# Patient Record
Sex: MALE | Race: WHITE
[De-identification: names, ages, dates, MRNs, and addresses within clinical notes are randomized per-mention and may not be internally consistent; named-entity substitution may affect disease eponyms.]

---

## 2016-09-14 VITALS
SYSTOLIC BLOOD PRESSURE: 110 MMHG | DIASTOLIC BLOOD PRESSURE: 54 MMHG | SYSTOLIC BLOOD PRESSURE: 110 MMHG | DIASTOLIC BLOOD PRESSURE: 54 MMHG

## 2017-01-27 ENCOUNTER — HOSPITAL ENCOUNTER (OUTPATIENT)
Dept: HOSPITAL 61 - KCIC | Age: 67
Discharge: HOME | End: 2017-01-27
Attending: FAMILY MEDICINE
Payer: COMMERCIAL

## 2017-01-27 DIAGNOSIS — M25.512: ICD-10-CM

## 2017-01-27 DIAGNOSIS — M25.511: Primary | ICD-10-CM

## 2017-01-27 DIAGNOSIS — M19.012: ICD-10-CM

## 2017-01-27 PROCEDURE — 73030 X-RAY EXAM OF SHOULDER: CPT

## 2017-05-15 ENCOUNTER — HOSPITAL ENCOUNTER (OUTPATIENT)
Dept: HOSPITAL 61 - KCIC | Age: 67
Discharge: HOME | End: 2017-05-15
Attending: NURSE PRACTITIONER
Payer: COMMERCIAL

## 2017-05-15 DIAGNOSIS — M75.92: ICD-10-CM

## 2017-05-15 DIAGNOSIS — M25.512: Primary | ICD-10-CM

## 2017-05-15 PROCEDURE — 73201 CT UPPER EXTREMITY W/DYE: CPT

## 2017-05-15 PROCEDURE — 73040 CONTRAST X-RAY OF SHOULDER: CPT

## 2017-05-15 NOTE — [facility]
PROCEDURE: 

Left shoulder injection using fluoroscopic guidance, prior to CT. 

HISTORY: 

Shoulder pain. 

TECHNIQUE: 

The procedure was explained to the patient as were potential risks, 

including among others infection, bleeding or allergic reaction. All 

questions were answered. Informed written and verbal consent was obtained.

The shoulder was prepped and draped in the usual sterile manner. Following

administration of local anesthetic, a 22-gauge needle was advanced into 

the anterior shoulder. Following negative aspiration, 12 cc of a solution 

of 15 cc Omnipaque-180 contrast and 5 cc 1% lidocaine was injected without

difficulty. The needle was removed. There was good hemostasis at the 

injection site. The patient left in stable condition without immediate 

complication. The patient was given postprocedural instructions, and 

instructed to contact us or the ER if there are any complications. 

A single spot image is obtained. 

FLUOROSCOPY TIME: 18 seconds 

 

Electronically signed by: Anders Cheek MD (May 15, 2017 17:18:58)

## 2017-05-15 NOTE — [facility]
PROCEDURE 

CT arthrogram of the left shoulder 

 

HISTORY 

Rotator cuff tendinitis. Pain for 1 year. Overhead repetitive motion. 

 

TECHNIQUE 

Standard noncontrast imaging 

Exposure: One or more of the following individualized dose reduction 

techniques were utilized for this exam: 1. Automated exposure control. 2. 

Adjustment of the mA and/or kV according to patient size. 3. Use of 

iterative reconstruction technique. 

 

COMPARISON 

None 

 

FINDINGS 

Acromioclavicular joint demonstrates primary osteoarthritis with small 

undersurface osteophytes. 

No evidence of abnormal contrast within the rotator cuff. Minimal contrast

within the anterior subdeltoid bursa likely just due to the injection 

procedure. No evidence of communicating defect. 

Glenohumeral joint appears intact without focal articular cartilage 

defect. 

Biceps tendon appears within the bicipital groove. 

Small focus of contrast identified at the anterior labrum, at 3 o'clock. I

suspect this is located just anterior to the labrum, rather than intra 

labral signal or tear. 

No obvious soft tissue abnormality. 

 

IMPRESSION 

1. No evidence of rotator cuff tear.

2. Acromioclavicular joint primary osteoarthritis.

3. Small focus of contrast identified at the anterior labrum, thought to 

be extralabral, rather than a tear.

 

 

 

Electronically signed by: Anders Cheek MD (May 15, 2017 14:49:58)

## 2017-06-01 ENCOUNTER — HOSPITAL ENCOUNTER (OUTPATIENT)
Dept: HOSPITAL 61 - CCL | Age: 67
Discharge: HOME | End: 2017-06-01
Attending: INTERNAL MEDICINE
Payer: COMMERCIAL

## 2017-06-01 VITALS — SYSTOLIC BLOOD PRESSURE: 140 MMHG | DIASTOLIC BLOOD PRESSURE: 76 MMHG

## 2017-06-01 VITALS — BODY MASS INDEX: 28.63 KG/M2 | WEIGHT: 200 LBS | HEIGHT: 70 IN

## 2017-06-01 VITALS — SYSTOLIC BLOOD PRESSURE: 112 MMHG | DIASTOLIC BLOOD PRESSURE: 67 MMHG

## 2017-06-01 VITALS — SYSTOLIC BLOOD PRESSURE: 130 MMHG | DIASTOLIC BLOOD PRESSURE: 71 MMHG

## 2017-06-01 VITALS — DIASTOLIC BLOOD PRESSURE: 67 MMHG | SYSTOLIC BLOOD PRESSURE: 114 MMHG

## 2017-06-01 VITALS
DIASTOLIC BLOOD PRESSURE: 78 MMHG | SYSTOLIC BLOOD PRESSURE: 120 MMHG | DIASTOLIC BLOOD PRESSURE: 78 MMHG | SYSTOLIC BLOOD PRESSURE: 120 MMHG

## 2017-06-01 VITALS — DIASTOLIC BLOOD PRESSURE: 74 MMHG | SYSTOLIC BLOOD PRESSURE: 144 MMHG

## 2017-06-01 VITALS — SYSTOLIC BLOOD PRESSURE: 114 MMHG | DIASTOLIC BLOOD PRESSURE: 69 MMHG

## 2017-06-01 VITALS — DIASTOLIC BLOOD PRESSURE: 71 MMHG | SYSTOLIC BLOOD PRESSURE: 138 MMHG

## 2017-06-01 VITALS — SYSTOLIC BLOOD PRESSURE: 136 MMHG | DIASTOLIC BLOOD PRESSURE: 69 MMHG

## 2017-06-01 DIAGNOSIS — I25.10: Primary | ICD-10-CM

## 2017-06-01 DIAGNOSIS — E78.00: ICD-10-CM

## 2017-06-01 DIAGNOSIS — Z72.89: ICD-10-CM

## 2017-06-01 DIAGNOSIS — E11.9: ICD-10-CM

## 2017-06-01 DIAGNOSIS — Z79.01: ICD-10-CM

## 2017-06-01 LAB
ANION GAP SERPL CALC-SCNC: 12 MMOL/L (ref 6–14)
APTT BLD: 32 SEC (ref 24–38)
BUN SERPL-MCNC: 33 MG/DL (ref 8–26)
CALCIUM SERPL-MCNC: 9.6 MG/DL (ref 8.5–10.1)
CHLORIDE SERPL-SCNC: 101 MMOL/L (ref 98–107)
CO2 SERPL-SCNC: 26 MMOL/L (ref 21–32)
CREAT SERPL-MCNC: 1.7 MG/DL (ref 0.7–1.3)
ERYTHROCYTE [DISTWIDTH] IN BLOOD BY AUTOMATED COUNT: 13 % (ref 11.5–14.5)
GFR SERPLBLD BASED ON 1.73 SQ M-ARVRAT: 40.5 ML/MIN
GLUCOSE SERPL-MCNC: 151 MG/DL (ref 70–99)
HCT VFR BLD CALC: 40.8 % (ref 39–53)
HGB BLD-MCNC: 13.8 G/DL (ref 13–17.5)
INR PPP: 0.9 (ref 0.8–1.1)
MCH RBC QN AUTO: 31 PG (ref 25–35)
MCHC RBC AUTO-ENTMCNC: 34 G/DL (ref 31–37)
MCV RBC AUTO: 91 FL (ref 79–100)
PLATELET # BLD AUTO: 285 X10^3/UL (ref 140–400)
POTASSIUM SERPL-SCNC: 4.8 MMOL/L (ref 3.5–5.1)
PROTHROMBIN TIME: 12 SEC (ref 11.7–14)
RBC # BLD AUTO: 4.48 X10^6/UL (ref 4.3–5.7)
SODIUM SERPL-SCNC: 139 MMOL/L (ref 136–145)
WBC # BLD AUTO: 9.7 X10^3/UL (ref 4–11)

## 2017-06-01 PROCEDURE — 93453 R&L HRT CATH W/VENTRICLGRPHY: CPT

## 2017-06-01 PROCEDURE — 93306 TTE W/DOPPLER COMPLETE: CPT

## 2017-06-01 PROCEDURE — C1773 RET DEV, INSERTABLE: HCPCS

## 2017-06-01 PROCEDURE — G0269 OCCLUSIVE DEVICE IN VEIN ART: HCPCS

## 2017-06-01 PROCEDURE — C1892 INTRO/SHEATH,FIXED,PEEL-AWAY: HCPCS

## 2017-06-01 PROCEDURE — 85730 THROMBOPLASTIN TIME PARTIAL: CPT

## 2017-06-01 PROCEDURE — 36415 COLL VENOUS BLD VENIPUNCTURE: CPT

## 2017-06-01 PROCEDURE — 80048 BASIC METABOLIC PNL TOTAL CA: CPT

## 2017-06-01 PROCEDURE — 85610 PROTHROMBIN TIME: CPT

## 2017-06-01 PROCEDURE — C1769 GUIDE WIRE: HCPCS

## 2017-06-01 PROCEDURE — 85027 COMPLETE CBC AUTOMATED: CPT

## 2017-06-01 PROCEDURE — 94620: CPT

## 2017-06-01 PROCEDURE — C1771 REP DEV, URINARY, W/SLING: HCPCS

## 2017-06-01 NOTE — CARD
--------------- APPROVED REPORT --------------





Procedure(s) performed: Left Heart Catheterization, Bypass angiography, coronary angiography

Right Heart Catheterization



HISTORY

The patient is a 67 year-old male with a history of : diabetes mellitus with treatment, coronary dana
ry disease, previous CABG (The CABG date was ).



INDICATION

The indication(s) include : dyspnea, Patient is a 67 y.o male with exertional dyspnea suggestive of s
ymptoms prior to his bypass. He was brought to the cath lab for further evaluation. .



CASE TECHNIQUE

During this case, Fluoroscopy and low osmolar contrast were used for imaging.



PROCEDURE NARRATIVE

The patient was brought electively to the cardiac catheterization lab.  A timeout was performed confi
rming the patient's name, date of birth, procedure, and site of procedure.  All necessary personnel w
ere wearing the appropriate protective equipment and radiation monitor devices.  After explaining the
 risks and benefits of the procedure and alternatives, informed consent was obtained. (See nursing no
gabriela for medications administered).  The right groin was sterilely prepped and draped in the usual fas
hion.  The right groin was infiltrated with 20 mL of 2% lidocaine for subcutaneous anesthesia.  A 6 F
sheath was inserted into the right femoral artery without difficulty via the modified seldinger techn
ique with an 18G needle and a J-tipped guidewire. Next, an 8Fr sheath was inserted in the right commo
n femoral vein in similar fashion without difficulty.  



A PA catheter was then advanced through the right heart chambers, pressures and saturations were obta
ined. Subsequently, right and left coronary angiography was performed using standard JR4 and JL4 diag
nostic catheters.  Left ventricular end diastolic pressure was obtained with a pigtail catheter and p
ullback was performed.



HEMODYNAMICS: 

LVEDP 18mm Hg

AO: 150/79

*No gradient on LV to aortic pullback. 



PCWP: 11 mm Hg

PA:35/11/25

RV: 36/10/22

RA: 8 mm Hg

Dmitriy: 4.5 L/min

Tco: 5.5 L/min



PA saturation: 71%

FA saturation: 98%



LEFT VENTRICULOGRAM: Deferred due to renal insufficiency. 



CORONARY ANGIOGRAPHY: 

LM is a large caliber vessel with normal angiographic appearance. 

LAD is a large caliber vessel an ostial occlusion. The mid to distal vessel fills via a patent LIMA b
ut is extremely small but patent. 

D1 has an ostial 100% occlusion. This fills via a SVG and distally has not significant disease. 

LCx is a moderate co-dominant vessel with a proximal 40% stenosis. The distal vessel fills in antegra
de and via a SVG to the LPL

OM1 is a small caliber vessel with normal angiographic appearance. 

LPL1 is a small caliber vessel with normal angiographic appearance. 

LPL2 is a moderate caliber vessel that fills via a SVG, there is also antegrade flow noted from the n
ative circulation. 

RCA is a moderate caliber dominant vessel with mild diffuse irregularities of up to 30%. 

RPDA is a moderate caliber vessels with mild diffuse irregularities of up to 30%.



BYPASS ANGIOGRAPHY: 

LIMA to LAD - Widely patent without anastomotic stenosis. 

SVG sequential to D1/LPL - Widely patent without anastomotic stenosis. 



Conclusion

1. Normal biventricular filling pressures. 

2. No pulmonary HTN

3. Normal cardiac output. 

4. Severe two vessel coronary artery disease involving the LAD/Diagonal. 

5. Patent LIMA, SVG to D1/LPL sequential graft. 



Recommendations

No obvious cardiac source of dyspnea noted. 

Plan for echocardiogram to rule out mitral or tricuspic insufficiency. 

Plan for 6 minute groves walk to rule out pulmonary source.

## 2017-07-14 ENCOUNTER — HOSPITAL ENCOUNTER (OUTPATIENT)
Dept: HOSPITAL 61 - KCIC | Age: 67
Discharge: HOME | End: 2017-07-14
Attending: INTERNAL MEDICINE
Payer: COMMERCIAL

## 2017-07-14 DIAGNOSIS — R06.02: Primary | ICD-10-CM

## 2017-07-14 PROCEDURE — 71020: CPT

## 2017-07-14 NOTE — [facility]
Indication: Shortness of air.

 

Time of exam 8:36 AM

 

Comparison is made with prior chest from 9/12/2016.

 

Changes of median sternotomy are noted. The heart size is normal. Lungs 

are clear. The vascularity is normal. No infiltrate, effusion or 

pneumothorax is seen.

 

IMPRESSION: No acute cardiopulmonary process is detected.

 

Electronically signed by: Lorenzo Scott MD (7/14/2017 8:49 AM) [id number]

## 2018-03-02 ENCOUNTER — HOSPITAL ENCOUNTER (EMERGENCY)
Dept: HOSPITAL 61 - ER | Age: 68
Discharge: HOME | End: 2018-03-02
Payer: COMMERCIAL

## 2018-03-02 DIAGNOSIS — Z79.4: ICD-10-CM

## 2018-03-02 DIAGNOSIS — E11.65: ICD-10-CM

## 2018-03-02 DIAGNOSIS — R11.2: Primary | ICD-10-CM

## 2018-03-02 DIAGNOSIS — R10.13: ICD-10-CM

## 2018-03-02 DIAGNOSIS — I95.9: ICD-10-CM

## 2018-03-02 DIAGNOSIS — Z95.1: ICD-10-CM

## 2018-03-02 DIAGNOSIS — K21.9: ICD-10-CM

## 2018-03-02 DIAGNOSIS — E78.00: ICD-10-CM

## 2018-03-02 LAB
% BANDS: 3 % (ref 0–9)
% LYMPHS: 1 % (ref 24–48)
% MONOS: 1 % (ref 0–10)
% SEGS: 95 % (ref 35–66)
ACETONE: (no result)
ADD MAN DIFF?: YES
ALBUMIN SERPL-MCNC: 3.8 G/DL (ref 3.4–5)
ALBUMIN/GLOB SERPL: 1 {RATIO} (ref 1–1.7)
ALP SERPL-CCNC: 115 U/L (ref 46–116)
ALT (SGPT): 34 U/L (ref 16–63)
ANION GAP SERPL CALC-SCNC: 14 MMOL/L (ref 6–14)
AST SERPL-CCNC: 21 U/L (ref 15–37)
BACTERIA,URINE: 0 /HPF
BASO #: 0 X10^3/UL (ref 0–0.2)
BASO %: 0 % (ref 0–3)
BILIRUBIN,URINE: NEGATIVE
BLOOD UREA NITROGEN: 32 MG/DL (ref 8–26)
BUN/CREAT SERPL: 21 (ref 6–20)
CALCIUM: 9.2 MG/DL (ref 8.5–10.1)
CHLORIDE: 96 MMOL/L (ref 98–107)
CLARITY,URINE: CLEAR
CO2 SERPL-SCNC: 27 MMOL/L (ref 21–32)
COLOR,URINE: YELLOW
CREAT SERPL-MCNC: 1.5 MG/DL (ref 0.7–1.3)
EOS #: 0 X10^3/UL (ref 0–0.7)
EOS %: 0 % (ref 0–3)
GFR SERPLBLD BASED ON 1.73 SQ M-ARVRAT: 46.8 ML/MIN
GLOBULIN SER-MCNC: 3.8 G/DL (ref 2.2–3.8)
GLUCOSE SERPL-MCNC: 339 MG/DL (ref 70–99)
GLUCOSE,URINE: >=1000 MG/DL
HCG SERPL-ACNC: 15.3 X10^3/UL (ref 4–11)
HEMATOCRIT: 40.1 % (ref 39–53)
HEMOGLOBIN: 14 G/DL (ref 13–17.5)
LIPASE: 116 U/L (ref 73–393)
LYMPH #: 0.3 X10^3/UL (ref 1–4.8)
LYMPH %: 2 % (ref 24–48)
MEAN CORPUSCULAR HEMOGLOBIN: 30 PG (ref 25–35)
MEAN CORPUSCULAR HGB CONC: 35 G/DL (ref 31–37)
MEAN CORPUSCULAR VOLUME: 86 FL (ref 79–100)
MONO #: 0.4 X10^3/UL (ref 0–1.1)
MONO %: 3 % (ref 0–9)
NEUT #: 14.6 X10^3UL (ref 1.8–7.7)
NEUT %: 95 % (ref 31–73)
NITRITE,URINE: NEGATIVE
PH,URINE: 6
PLATELET COUNT: 244 X10^3/UL (ref 140–400)
PLT ESTIMATE: ADEQUATE
POC GLUCOSE: 228 MG/DL (ref 70–99)
POTASSIUM SERPL-SCNC: 3.6 MMOL/L (ref 3.5–5.1)
PROTEIN,URINE: 100 MG/DL
RBC,URINE: (no result) /HPF (ref 0–2)
RED BLOOD COUNT: 4.69 X10^6/UL (ref 4.3–5.7)
RED CELL DISTRIBUTION WIDTH: 13.6 % (ref 11.5–14.5)
SODIUM: 137 MMOL/L (ref 136–145)
SPECIFIC GRAVITY,URINE: >=1.03
SQUAMOUS EPITHELIAL CELL,UR: (no result) /LPF
TOTAL BILIRUBIN: 0.9 MG/DL (ref 0.2–1)
TOTAL PROTEIN: 7.6 G/DL (ref 6.4–8.2)
TROPONINI: < 0.017 NG/ML (ref 0–0.06)
UROBILINOGEN,URINE: 0.2 MG/DL
WBC,URINE: (no result) /HPF (ref 0–4)

## 2018-03-02 PROCEDURE — 84484 ASSAY OF TROPONIN QUANT: CPT

## 2018-03-02 PROCEDURE — 96375 TX/PRO/DX INJ NEW DRUG ADDON: CPT

## 2018-03-02 PROCEDURE — 93005 ELECTROCARDIOGRAM TRACING: CPT

## 2018-03-02 PROCEDURE — 85025 COMPLETE CBC W/AUTO DIFF WBC: CPT

## 2018-03-02 PROCEDURE — 85007 BL SMEAR W/DIFF WBC COUNT: CPT

## 2018-03-02 PROCEDURE — 36415 COLL VENOUS BLD VENIPUNCTURE: CPT

## 2018-03-02 PROCEDURE — 80053 COMPREHEN METABOLIC PANEL: CPT

## 2018-03-02 PROCEDURE — 82010 KETONE BODYS QUAN: CPT

## 2018-03-02 PROCEDURE — 99285 EMERGENCY DEPT VISIT HI MDM: CPT

## 2018-03-02 PROCEDURE — 83690 ASSAY OF LIPASE: CPT

## 2018-03-02 PROCEDURE — 96361 HYDRATE IV INFUSION ADD-ON: CPT

## 2018-03-02 PROCEDURE — 82962 GLUCOSE BLOOD TEST: CPT

## 2018-03-02 PROCEDURE — 96365 THER/PROPH/DIAG IV INF INIT: CPT

## 2018-03-02 PROCEDURE — 81001 URINALYSIS AUTO W/SCOPE: CPT

## 2018-03-02 RX ADMIN — ONDANSETRON 1 MG: 2 INJECTION INTRAMUSCULAR; INTRAVENOUS at 18:10

## 2018-03-02 RX ADMIN — PROMETHAZINE HYDROCHLORIDE 1 MLS/HR: 25 INJECTION INTRAMUSCULAR; INTRAVENOUS at 19:15

## 2018-03-02 RX ADMIN — BACITRACIN 1 MLS/HR: 5000 INJECTION, POWDER, FOR SOLUTION INTRAMUSCULAR at 18:09

## 2018-03-02 RX ADMIN — ONDANSETRON 1 MG: 4 TABLET, ORALLY DISINTEGRATING ORAL at 21:56

## 2018-03-02 RX ADMIN — FAMOTIDINE 1 MG: 10 INJECTION, SOLUTION INTRAVENOUS at 18:10

## 2018-03-02 RX ADMIN — BACITRACIN 1 MLS/HR: 5000 INJECTION, POWDER, FOR SOLUTION INTRAMUSCULAR at 19:12

## 2018-07-20 ENCOUNTER — HOSPITAL ENCOUNTER (EMERGENCY)
Dept: HOSPITAL 61 - ER | Age: 68
Discharge: HOME | End: 2018-07-20
Payer: COMMERCIAL

## 2018-07-20 VITALS — DIASTOLIC BLOOD PRESSURE: 80 MMHG | SYSTOLIC BLOOD PRESSURE: 140 MMHG

## 2018-07-20 DIAGNOSIS — E87.6: ICD-10-CM

## 2018-07-20 DIAGNOSIS — E11.65: Primary | ICD-10-CM

## 2018-07-20 DIAGNOSIS — E78.00: ICD-10-CM

## 2018-07-20 DIAGNOSIS — K21.9: ICD-10-CM

## 2018-07-20 DIAGNOSIS — Z95.5: ICD-10-CM

## 2018-07-20 DIAGNOSIS — I11.9: ICD-10-CM

## 2018-07-20 LAB
ACETONE: (no result)
ADD MAN DIFF?: NO
ALBUMIN SERPL-MCNC: 4.4 G/DL (ref 3.4–5)
ALBUMIN/GLOB SERPL: 1.2 {RATIO} (ref 1–1.7)
ALP SERPL-CCNC: 92 U/L (ref 46–116)
ALT (SGPT): 30 U/L (ref 16–63)
ANION GAP SERPL CALC-SCNC: 13 MMOL/L (ref 6–14)
AST SERPL-CCNC: 20 U/L (ref 15–37)
BACTERIA,URINE: 0 /HPF
BASO #: 0 X10^3/UL (ref 0–0.2)
BASO %: 0 % (ref 0–3)
BILIRUBIN,URINE: NEGATIVE
BLOOD UREA NITROGEN: 25 MG/DL (ref 8–26)
BUN/CREAT SERPL: 15 (ref 6–20)
CALCIUM: 9.3 MG/DL (ref 8.5–10.1)
CHLORIDE: 92 MMOL/L (ref 98–107)
CLARITY,URINE: CLEAR
CO2 SERPL-SCNC: 26 MMOL/L (ref 21–32)
COLOR,URINE: YELLOW
CREAT SERPL-MCNC: 1.7 MG/DL (ref 0.7–1.3)
EOS #: 0 X10^3/UL (ref 0–0.7)
EOS %: 0 % (ref 0–3)
GFR SERPLBLD BASED ON 1.73 SQ M-ARVRAT: 40.4 ML/MIN
GLOBULIN SER-MCNC: 3.8 G/DL (ref 2.2–3.8)
GLUCOSE SERPL-MCNC: 469 MG/DL (ref 70–99)
GLUCOSE,URINE: >=1000 MG/DL
HCG SERPL-ACNC: 11.1 X10^3/UL (ref 4–11)
HEMATOCRIT: 41.4 % (ref 39–53)
HEMOGLOBIN: 14.7 G/DL (ref 13–17.5)
LYMPH #: 1.9 X10^3/UL (ref 1–4.8)
LYMPH %: 17 % (ref 24–48)
MAGNESIUM: 1.7 MG/DL (ref 1.8–2.4)
MEAN CORPUSCULAR HEMOGLOBIN: 30 PG (ref 25–35)
MEAN CORPUSCULAR HGB CONC: 36 G/DL (ref 31–37)
MEAN CORPUSCULAR VOLUME: 83 FL (ref 79–100)
MONO #: 0.9 X10^3/UL (ref 0–1.1)
MONO %: 8 % (ref 0–9)
NEUT #: 8.3 X10^3UL (ref 1.8–7.7)
NEUT %: 75 % (ref 31–73)
NITRITE,URINE: NEGATIVE
OSMOLALITY, SERUM: 302 MOSM/KG (ref 279–304)
PH,URINE: 5.5
PLATELET COUNT: 332 X10^3/UL (ref 140–400)
POC GLUCOSE: 115 MG/DL (ref 70–99)
POC GLUCOSE: 477 MG/DL (ref 70–99)
POTASSIUM SERPL-SCNC: 3.1 MMOL/L (ref 3.5–5.1)
PROTEIN,URINE: 100 MG/DL
RBC,URINE: (no result) /HPF (ref 0–2)
RED BLOOD COUNT: 4.97 X10^6/UL (ref 4.3–5.7)
RED CELL DISTRIBUTION WIDTH: 14.5 % (ref 11.5–14.5)
SODIUM: 131 MMOL/L (ref 136–145)
SPECIFIC GRAVITY,URINE: >=1.03
SQUAMOUS EPITHELIAL CELL,UR: (no result) /LPF
TOTAL BILIRUBIN: 1 MG/DL (ref 0.2–1)
TOTAL PROTEIN: 8.2 G/DL (ref 6.4–8.2)
UROBILINOGEN,URINE: 0.2 MG/DL
WBC,URINE: (no result) /HPF (ref 0–4)

## 2018-07-20 PROCEDURE — 82962 GLUCOSE BLOOD TEST: CPT

## 2018-07-20 PROCEDURE — 93005 ELECTROCARDIOGRAM TRACING: CPT

## 2018-07-20 PROCEDURE — 71046 X-RAY EXAM CHEST 2 VIEWS: CPT

## 2018-07-20 PROCEDURE — 80053 COMPREHEN METABOLIC PANEL: CPT

## 2018-07-20 PROCEDURE — 82010 KETONE BODYS QUAN: CPT

## 2018-07-20 PROCEDURE — 36415 COLL VENOUS BLD VENIPUNCTURE: CPT

## 2018-07-20 PROCEDURE — 83930 ASSAY OF BLOOD OSMOLALITY: CPT

## 2018-07-20 PROCEDURE — 85025 COMPLETE CBC W/AUTO DIFF WBC: CPT

## 2018-07-20 PROCEDURE — 83735 ASSAY OF MAGNESIUM: CPT

## 2018-07-20 PROCEDURE — 96361 HYDRATE IV INFUSION ADD-ON: CPT

## 2018-07-20 PROCEDURE — 99285 EMERGENCY DEPT VISIT HI MDM: CPT

## 2018-07-20 PROCEDURE — 96374 THER/PROPH/DIAG INJ IV PUSH: CPT

## 2018-07-20 PROCEDURE — 81001 URINALYSIS AUTO W/SCOPE: CPT

## 2018-07-20 RX ADMIN — INSULIN LISPRO 1 UNIT: 100 INJECTION, SOLUTION INTRAVENOUS; SUBCUTANEOUS at 17:08

## 2018-07-20 RX ADMIN — POTASSIUM CHLORIDE 1 MEQ: 1500 TABLET, EXTENDED RELEASE ORAL at 18:01

## 2018-07-20 RX ADMIN — BACITRACIN 1 MLS/HR: 5000 INJECTION, POWDER, FOR SOLUTION INTRAMUSCULAR at 16:36

## 2018-09-07 NOTE — CARD
--------------- APPROVED REPORT --------------





EXAM: Two-dimensional and M-mode echocardiogram with Doppler and color Doppler.



Other Information 

Quality : GoodHR: 62bpm

Rhythm : NSR



INDICATION

Dyspnea



2D DIMENSIONS 

RVDd3.4 (2.9-3.5cm)Left Atrium(2D)3.6 (1.6-4.0cm)

IVSd1.3 (0.7-1.1cm)Aortic Root(2D)3.3 (2.0-3.7cm)

LVDd3.3 (3.9-5.9cm)LVOT Diameter2.3 (1.8-2.4cm)

PWd1.2 (0.7-1.1cm)LVDs2.6 (2.5-4.0cm)

FS (%) 22.5 %SV20.9 ml

LVEF(%)46.5 (>50%)



Aortic Valve

AoV Peak Dean.119.6cm/sAoV VTI22.6cm

AO Peak GR.5.7mmHgLVOT Peak Dean.80.6cm/s

AO Mean GR.2mmHgAVA (VMAX)2.85cm2



Mitral Valve

MV E Xdddfuzb42.5cm/sMV E Peak Gr.2mmHg

MV DECEL [id number] A Kixqflwi53.9cm/s

MV E Mean Gr.1mmHgE/A  Ratio1.1

MV A [id number]



Pulmonary Valve

PV Peak Tafxvwnt12.3cm/s



Tricuspid Valve

TR P. Seigncov923xs/sTR Peak Gr.16mmHg



Pulmonary Vein

S1 Iuccbddv56.1cm/sD2 Abhvmhei41.4cm/s

PVa [id number]



 LEFT VENTRICLE 

The left ventricle cavity is small. There is mild concentric left ventricular hypertrophy. The left v
entricular systolic function is normal and the ejection fraction is within normal range. The Ejection
 Fraction is 60-65%. There is normal LV segmental wall motion. Septal motion consistent with post-ope
rative state. The left ventricular diastolic function and filling is normal for age.



 RIGHT VENTRICLE 

The right ventricle is normal size. There is normal right ventricular wall thickness. The right ventr
icular systolic function is normal.



 ATRIA 

The left atrium size is normal. The right atrium size is normal. The interatrial septum is intact wit
h no evidence for an atrial septal defect or patent foramen ovale as noted on 2-D or Doppler imaging.




 AORTIC VALVE 

The aortic valve is mildly sclerotic. The aortic valve is trileaflet. Doppler and Color Flow revealed
 no significant aortic regurgitation. There is no significant aortic valvular stenosis.



 MITRAL VALVE 

Mitral annular calcification is mild. The mitral valve leaflets are thickened. There is no evidence o
f mitral valve prolapse. There is no mitral valve stenosis. Doppler and Color Flow revealed trace lula
ral regurgitation.



 TRICUSPID VALVE 

Doppler and Color Flow revealed trace tricuspid regurgitation. The pulmonary artery systolic pressure
 is estimated at 19 mmHg. There is no pulmonary hypertension.



 PULMONIC VALVE 

Doppler and Color Flow revealed trace pulmonic valvular regurgitation. There is no pulmonic valvular 
stenosis.



 GREAT VESSELS 

The aortic root is normal in size. The ascending aorta is normal in size. The IVC is normal in size a
nd collapses >50% with inspiration.



 PERICARDIAL EFFUSION 

There is no evidence of significant pericardial effusion.



Critical Notification

Critical Value: No



<Conclusion>

There is mild concentric left ventricular hypertrophy.

The left ventricular systolic function is normal and the ejection fraction is within normal range. Th
e Ejection Fraction is 60-65%.

There is normal LV segmental wall motion.  Septal motion consistent with post-operative state. Take 10 mLs by mouth 2 times daily    METOCLOPRAMIDE (REGLAN) 5 MG TABLET    Take 1 tablet by mouth 3 times daily    ONDANSETRON (ZOFRAN) 8 MG TABLET    Take 1 tablet by mouth every 8 hours as needed for Nausea or Vomiting    PANTOPRAZOLE (PROTONIX) 40 MG TABLET    TAKE ONE TABLET BY MOUTH ONE TIME A DAY    VENLAFAXINE (EFFEXOR XR) 150 MG EXTENDED RELEASE CAPSULE    Take 1 capsule by mouth daily    ZOLPIDEM (AMBIEN) 10 MG TABLET    TAKE 1 TABLET BY MOUTH NIGHTLY AS NEEDED FOR SLEEP       Allergies     He has No Known Allergies. Physical Exam     INITIAL VITALS: BP: (!) 131/99, Temp: 98.5 °F (36.9 °C), Pulse: 99, Resp: 18, SpO2: 100 %   Physical Exam   Constitutional: He is oriented to person, place, and time. He appears well-developed and well-nourished. No distress. HENT:   Head: Normocephalic and atraumatic. Mouth/Throat: Oropharynx is clear and moist. No oropharyngeal exudate. Eyes: Pupils are equal, round, and reactive to light. Conjunctivae and EOM are normal. Right eye exhibits no discharge. Left eye exhibits no discharge. Neck: Normal range of motion. Neck supple. No JVD present. No tracheal deviation present. No thyromegaly present. Cardiovascular: Normal rate, regular rhythm, normal heart sounds and intact distal pulses. Exam reveals no gallop and no friction rub. No murmur heard. Pulmonary/Chest: Breath sounds normal. No stridor. He is in respiratory distress (Increased work of breathing with prolonged expiratory phase, tachypneic). He has no wheezes. He has no rales. He exhibits no tenderness. Abdominal: Soft. Bowel sounds are normal. He exhibits no distension. There is no tenderness. There is no rebound and no guarding. Musculoskeletal: Normal range of motion. He exhibits edema (swelling noted to right ankle, otherwise no calf ttp or swelling, normal R ankle rom). He exhibits no tenderness or deformity. Neurological: He is alert and oriented to person, place, and time.  No cranial nerve deficit. He exhibits normal muscle tone. Coordination normal.   Skin: Skin is warm and dry. No rash noted. He is not diaphoretic. No erythema. Psychiatric: He has a normal mood and affect. His behavior is normal.       Diagnostic Results     EKG   Indication: Shortness of breath. Heart rate 93, intervals normal, axis normal.  No signs of ST elevation or depression, no T-wave inversions. Comparison to prior EKG with no changes. Impression: Normal sinus rhythm, normal EKG. RADIOLOGY:  CTA CHEST W CONTRAST   Final Result   1. Moderate right pulmonary emboli. Results were called to the emergency room at time of dictation. 2. 0.5 cm subpleural nodule within the left lower lobe, increased in size, consistent with a pulmonary metastasis. 3. Prior Whipple procedure. 3.0 cm mass encasing the hepatic artery, slightly increased in size from 5/3/2018.      4. 2.2 cm low-attenuation lesion within medial segment of left lobe of liver, most likely representing a hepatic metastasis. 5. Small amount of perihepatic and perisplenic ascites. CT Head WO Contrast   Final Result   Normal noncontrast cranial computed tomography. XR ANKLE RIGHT (MIN 3 VIEWS)   Final Result   1. Mild soft tissue swelling. 2. Mild bony spurring on the superior surface of tarsal bones. XR CHEST STANDARD (2 VW)   Final Result   Minimal airspace disease within the right lower lobe (atelectasis versus pneumonia).                 LABS:   Results for orders placed or performed during the hospital encounter of 97/52/36   Basic Metabolic Panel w/ Reflex to MG   Result Value Ref Range    Sodium 141 136 - 145 mmol/L    Potassium reflex Magnesium 3.5 3.5 - 5.1 mmol/L    Chloride 107 99 - 110 mmol/L    CO2 21 21 - 32 mmol/L    Anion Gap 13 3 - 16    Glucose 168 (H) 70 - 99 mg/dL    BUN 12 7 - 20 mg/dL    CREATININE 0.8 0.8 - 1.3 mg/dL    GFR Non-African American >60 >60    GFR African American >60 >60    Calcium 7.8 (L) 8.3 examination remarkable for tachypnea and slightly increased work of breathing with prolonged expiratory phase, however lung examination unremarkable. Patient with no history of COPD or asthma. Given known history of cancer, I am concerned for pulmonary embolism. Leg exam is atypical for calf DVT especially with isolated ankle swelling with no Swelling or tenderness to palpation. I did order d-dimer which was significantly elevated, and ordered CTPA which is pending at this time. Chest x-ray unremarkable with no significant pneumonia or edema. Labs unremarkable including negative troponin and nonspecifically elevated BNP at 438. Patient with no history of heart failure. Atypical story for ACS. At this time I'm going off service, oncoming provider will follow-up on CTPA. Patient stable on reassessment. Addendum: I was still in the emergency department when patient's CAT scan came back positive for pulmonary embolism without right heart strain. He is still stable on reassessment on room air. He will be admitted with heparin for further evaluation and management. Clinical Impression     1. Dyspnea, unspecified type    2. Other acute pulmonary embolism without acute cor pulmonale (HCC)        Disposition     PATIENT REFERRED TO:  No follow-up provider specified.     DISCHARGE MEDICATIONS:  New Prescriptions    No medications on file       DISPOSITION  - admit       Sia Keller MD  09/06/18 0628       Sia Keller MD  09/06/18 240 [address], [place]  09/06/18 [id number]

## 2018-11-07 ENCOUNTER — HOSPITAL ENCOUNTER (OUTPATIENT)
Dept: HOSPITAL 61 - ECHO | Age: 68
Discharge: HOME | End: 2018-11-07
Attending: INTERNAL MEDICINE
Payer: COMMERCIAL

## 2018-11-07 DIAGNOSIS — I51.7: ICD-10-CM

## 2018-11-07 DIAGNOSIS — I34.0: Primary | ICD-10-CM

## 2018-11-07 PROCEDURE — 93306 TTE W/DOPPLER COMPLETE: CPT

## 2018-11-07 NOTE — CARD
MR#: [id number]

Account#: [id number]

Accession#: [id number]

Date of Study: 11/07/2018

Ordering Physician: JUAN PABLO JETT, 

Referring Physician: JUAN PABLO JETT, 

Tech: Violette Colindres ARNAUD





--------------- APPROVED REPORT --------------





EXAM: Two-dimensional and M-mode echocardiogram with Doppler and color Doppler.



Other Information 

Quality : GoodHR: 60bpm

Rhythm : NSR



INDICATION

Pre Op



2D DIMENSIONS 

RVDd3.1 (2.9-3.5cm)Left Atrium(2D)3.8 (1.6-4.0cm)

IVSd1.5 (0.7-1.1cm)Aortic Root(2D)3.2 (2.0-3.7cm)

LVDd4.4 (3.9-5.9cm)LVOT Diameter2.0 (1.8-2.4cm)

PWd1.3 (0.7-1.1cm)LVDs3.0 (2.5-4.0cm)

FS (%) 32.1 %SV53.0 ml

LVEF(%)60.5 (>50%)



M-Mode DIMENSIONS 

Left Atrium(MM)3.48 (2.5-4.0cm)Aortic Root3.77 (2.2-3.7cm)



Aortic Valve

AoV Peak Dean.144.0cm/sAoV VTI31.8cm

AO Peak GR.8.3mmHgLVOT Peak Dean.125.0cm/s

AO Mean GR.4mmHgAVA (VMAX)2.76cm2

RICKI   (VTI)2.80cm2



Mitral Valve

MV E Foieatji93.4cm/sMV E Peak Gr.4mmHg

MV DECEL [id number] A Rcfcgwgh75.2cm/s

MV E Mean Gr.1mmHgE/A  Ratio1.5

MV A [id number]



Pulmonary Valve

PV Peak Rgjlrzzx78.7cm/s



Tricuspid Valve

TR P. Tairfvox256qm/sRAP [id number]

TR Peak Gr.86sxJmGCXG15zrEk



Pulmonary Vein

S1 Ytokxcpo13.5cm/sD2 Gsjfaufl32.4cm/s

PVa ssoyczhx13sijg



 LEFT VENTRICLE 

The left ventricle is normal size. There is mild concentric left ventricular hypertrophy. The left ve
ntricular systolic function is normal and the ejection fraction is within normal range. The Ejection 
Fraction is 55-60%. There is normal LV segmental wall motion. Transmitral Doppler flow pattern is Gra
de I-abnormal relaxation pattern.



 RIGHT VENTRICLE 

The right ventricle is normal size. There is normal right ventricular wall thickness. The right ventr
icular systolic function is normal.



 ATRIA 

The left atrium size is normal. The right atrium size is normal. The interatrial septum is intact wit
h no evidence for an atrial septal defect or patent foramen ovale as noted on 2-D or Doppler imaging.




 AORTIC VALVE 

The aortic valve is trileaflet. The aortic valve is calcified but opens well. Doppler and Color Flow 
revealed no significant aortic regurgitation. There is no significant aortic valvular stenosis.



 MITRAL VALVE 

The mitral valve is normal in structure and function. There is no evidence of mitral valve prolapse. 
There is no mitral valve stenosis. Doppler and Color-flow revealed trace to mild mitral regurgitation
.



 TRICUSPID VALVE 

The tricuspid valve is normal in structure and function. Doppler and Color Flow revealed trace tricus
pid regurgitation. The PA pressure was estimated at 24 mmHg. There is no tricuspid valve prolapse or 
vegetation. There is no tricuspid valve stenosis.



 PULMONIC VALVE 

The pulmonary valve is normal in structure and function. Doppler and Color Flow revealed trace pulmon
ic valvular regurgitation. There is no pulmonic valvular stenosis.



 GREAT VESSELS 

The aortic root is normal in size.



 PERICARDIAL EFFUSION 

There is no evidence of significant pericardial effusion.



Critical Notification

Critical Value: No



<Conclusion>

The left ventricle is normal size.

The left ventricular systolic function is normal and the ejection fraction is within normal range.

The Ejection Fraction is 55-60%.

There is mild concentric left ventricular hypertrophy.

There is no significant aortic valvular stenosis.

Doppler and Color Flow revealed no significant aortic regurgitation.

Doppler and Color-flow revealed trace to mild mitral regurgitation.

Doppler and Color Flow revealed trace tricuspid regurgitation.

The PA pressure was estimated at 24 mmHg.



Signed by : Severino Dee MD

Electronically Approved : 11/07/2018 11:34:22

## 2019-02-26 ENCOUNTER — HOSPITAL ENCOUNTER (EMERGENCY)
Dept: HOSPITAL 61 - ER | Age: 69
Discharge: HOME | End: 2019-02-26
Payer: COMMERCIAL

## 2019-02-26 VITALS
SYSTOLIC BLOOD PRESSURE: 132 MMHG | DIASTOLIC BLOOD PRESSURE: 61 MMHG | DIASTOLIC BLOOD PRESSURE: 61 MMHG | SYSTOLIC BLOOD PRESSURE: 132 MMHG | DIASTOLIC BLOOD PRESSURE: 61 MMHG | SYSTOLIC BLOOD PRESSURE: 132 MMHG

## 2019-02-26 VITALS — WEIGHT: 180 LBS | BODY MASS INDEX: 25.77 KG/M2 | HEIGHT: 70 IN

## 2019-02-26 DIAGNOSIS — I10: ICD-10-CM

## 2019-02-26 DIAGNOSIS — Z90.89: ICD-10-CM

## 2019-02-26 DIAGNOSIS — R11.2: ICD-10-CM

## 2019-02-26 DIAGNOSIS — E86.0: Primary | ICD-10-CM

## 2019-02-26 DIAGNOSIS — M79.10: ICD-10-CM

## 2019-02-26 DIAGNOSIS — K21.9: ICD-10-CM

## 2019-02-26 DIAGNOSIS — E78.00: ICD-10-CM

## 2019-02-26 DIAGNOSIS — Z95.1: ICD-10-CM

## 2019-02-26 DIAGNOSIS — E11.9: ICD-10-CM

## 2019-02-26 LAB
ALBUMIN SERPL-MCNC: 4.2 G/DL (ref 3.4–5)
ALBUMIN/GLOB SERPL: 1.3 {RATIO} (ref 1–1.7)
ALP SERPL-CCNC: 66 U/L (ref 46–116)
ALT SERPL-CCNC: 29 U/L (ref 16–63)
ANION GAP SERPL CALC-SCNC: 13 MMOL/L (ref 6–14)
APTT PPP: YELLOW S
AST SERPL-CCNC: 20 U/L (ref 15–37)
BACTERIA #/AREA URNS HPF: 0 /HPF
BASOPHILS # BLD AUTO: 0.1 X10^3/UL (ref 0–0.2)
BASOPHILS NFR BLD: 1 % (ref 0–3)
BILIRUB SERPL-MCNC: 0.7 MG/DL (ref 0.2–1)
BILIRUB UR QL STRIP: (no result)
BUN SERPL-MCNC: 25 MG/DL (ref 8–26)
BUN/CREAT SERPL: 16 (ref 6–20)
CALCIUM SERPL-MCNC: 9.2 MG/DL (ref 8.5–10.1)
CHLORIDE SERPL-SCNC: 99 MMOL/L (ref 98–107)
CO2 SERPL-SCNC: 27 MMOL/L (ref 21–32)
CREAT SERPL-MCNC: 1.6 MG/DL (ref 0.7–1.3)
EOSINOPHIL NFR BLD: 0 % (ref 0–3)
EOSINOPHIL NFR BLD: 0 X10^3/UL (ref 0–0.7)
ERYTHROCYTE [DISTWIDTH] IN BLOOD BY AUTOMATED COUNT: 13.2 % (ref 11.5–14.5)
FIBRINOGEN PPP-MCNC: CLEAR MG/DL
GFR SERPLBLD BASED ON 1.73 SQ M-ARVRAT: 43.3 ML/MIN
GLOBULIN SER-MCNC: 3.2 G/DL (ref 2.2–3.8)
GLUCOSE SERPL-MCNC: 202 MG/DL (ref 70–99)
HCT VFR BLD CALC: 40.9 % (ref 39–53)
HGB BLD-MCNC: 13.8 G/DL (ref 13–17.5)
HYALINE CASTS #/AREA URNS LPF: (no result) /HPF
INFLUENZA A PATIENT: NEGATIVE
INFLUENZA B PATIENT: NEGATIVE
LYMPHOCYTES # BLD: 2.4 X10^3/UL (ref 1–4.8)
LYMPHOCYTES NFR BLD AUTO: 22 % (ref 24–48)
MAGNESIUM SERPL-MCNC: 1.8 MG/DL (ref 1.8–2.4)
MCH RBC QN AUTO: 30 PG (ref 25–35)
MCHC RBC AUTO-ENTMCNC: 34 G/DL (ref 31–37)
MCV RBC AUTO: 89 FL (ref 79–100)
MONO #: 0.9 X10^3/UL (ref 0–1.1)
MONOCYTES NFR BLD: 8 % (ref 0–9)
NEUT #: 7.3 X10^3UL (ref 1.8–7.7)
NEUTROPHILS NFR BLD AUTO: 69 % (ref 31–73)
NITRITE UR QL STRIP: NEGATIVE
PH UR STRIP: 6.5 [PH]
PLATELET # BLD AUTO: 329 X10^3/UL (ref 140–400)
POTASSIUM SERPL-SCNC: 3.7 MMOL/L (ref 3.5–5.1)
PROT SERPL-MCNC: 7.4 G/DL (ref 6.4–8.2)
PROT UR STRIP-MCNC: 100 MG/DL
RBC # BLD AUTO: 4.59 X10^6/UL (ref 4.3–5.7)
RBC #/AREA URNS HPF: (no result) /HPF (ref 0–2)
SODIUM SERPL-SCNC: 139 MMOL/L (ref 136–145)
SQUAMOUS #/AREA URNS LPF: (no result) /LPF
UROBILINOGEN UR-MCNC: 1 MG/DL
WBC # BLD AUTO: 10.6 X10^3/UL (ref 4–11)
WBC #/AREA URNS HPF: 0 /HPF (ref 0–4)

## 2019-02-26 PROCEDURE — 99284 EMERGENCY DEPT VISIT MOD MDM: CPT

## 2019-02-26 PROCEDURE — 83735 ASSAY OF MAGNESIUM: CPT

## 2019-02-26 PROCEDURE — 80053 COMPREHEN METABOLIC PANEL: CPT

## 2019-02-26 PROCEDURE — 96360 HYDRATION IV INFUSION INIT: CPT

## 2019-02-26 PROCEDURE — 71046 X-RAY EXAM CHEST 2 VIEWS: CPT

## 2019-02-26 PROCEDURE — 84484 ASSAY OF TROPONIN QUANT: CPT

## 2019-02-26 PROCEDURE — 81001 URINALYSIS AUTO W/SCOPE: CPT

## 2019-02-26 PROCEDURE — 36415 COLL VENOUS BLD VENIPUNCTURE: CPT

## 2019-02-26 PROCEDURE — 85025 COMPLETE CBC W/AUTO DIFF WBC: CPT

## 2019-02-26 PROCEDURE — 87804 INFLUENZA ASSAY W/OPTIC: CPT

## 2019-02-26 PROCEDURE — 93005 ELECTROCARDIOGRAM TRACING: CPT
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
## 2019-02-26 NOTE — RAD
EXAM: Chest, 2 views.

 

HISTORY: Cough.

 

COMPARISON: 7/20/2018.

 

FINDINGS: 2 views the chest are obtained. There is no infiltrate, pleural 

effusion or pneumothorax. The heart is normal in size. There are findings 

consistent with CABG.

 

IMPRESSION: No acute pulmonary finding.

 

Electronically signed by: Oly Simental MD (2/26/2019 4:43 PM) [facility]

## 2019-02-27 NOTE — EKG
[facility]

              [address] [place] [address]

Test Date:    2019               Test Time:    15:55:41

Pat Name:     HERNANDO LOZANO            Department:   

Patient ID:   [id number]           Room:          

Gender:       M                        Technician:   

:          1950               Requested By: KERRY CADENA

Order Number: [id number]           Reading MD:   Mychal Garcia

                                 Measurements

Intervals                              Axis          

Rate:         75                       P:            43

AZ:           154                      QRS:          -14

QRSD:         98                       T:            23

QT:           336                                    

QTc:          378                                    

                           Interpretive Statements

SINUS RHYTHM

LEFTWARD AXIS





Electronically Signed On 3-5-2019 11:01:10 CST by Mychal Garcia

## 2019-08-02 ENCOUNTER — HOSPITAL ENCOUNTER (OUTPATIENT)
Dept: HOSPITAL 61 - KCIC CT | Age: 69
Discharge: HOME | End: 2019-08-02
Attending: FAMILY MEDICINE
Payer: COMMERCIAL

## 2019-08-02 DIAGNOSIS — M50.222: ICD-10-CM

## 2019-08-02 DIAGNOSIS — I70.8: ICD-10-CM

## 2019-08-02 DIAGNOSIS — M25.78: ICD-10-CM

## 2019-08-02 DIAGNOSIS — M50.31: ICD-10-CM

## 2019-08-02 DIAGNOSIS — M47.812: ICD-10-CM

## 2019-08-02 DIAGNOSIS — M48.03: Primary | ICD-10-CM

## 2019-08-02 PROCEDURE — 72125 CT NECK SPINE W/O DYE: CPT

## 2019-08-02 NOTE — [facility]
CT cervical spine without contrast

 

History: Bilateral shoulder and arm pain, twisting injury

 

Technique: Noncontrast CT imaging was performed of the cervical spine. 

Multiplanar reconstruction images are submitted.  Exposure: One or more of

the following individualized dose reduction techniques were utilized for 

this examination:  1. Automated exposure control  2. Adjustment of the mA 

and/or kV according to patient size  3. Use of iterative reconstruction 

technique.

 

Comparison: None

 

Findings: Cervical vertebral body stature and AP alignment are within 

normal limits. Atlanto-axial distance is within normal limits. There is 

appropriate alignment of lateral masses of C1 relative to C2. Occipital 

condylar-C1 relationship is maintained. There is moderate degenerative 

disc disease C6-7, minimally at C5-6, C3-4, C2-3.

 

There is atherosclerotic calcification of the carotid arteries in the neck

bilaterally.

 

C2-C3: There is shallow posterior protrusion estimated about 2 mm AP. 

Central canal is minimally narrowed to about 8 to 9 mm. Neural foramina 

are not significantly narrowed. There is mild uncovertebral degenerative 

change, also bilateral facet degenerative change.

 

C3-4: There is minimal disc osteophyte complex, superimposed shallow 

central protrusion, likely narrowing of the central canal about 8 mm. 

There is bilateral facet and left greater than right uncovertebral 

degenerative change. There is minimal narrowing of the left neural 

foramen, right neural foramen adequate.

 

C4-C5: There is moderate to severe facet degenerative change bilaterally. 

There is shallow posterior central protrusion. Central canal is likely 

minimally narrowed about 9 to 10 mm. There is mild neural foramina 

compromise bilaterally.

 

C5-C6: There is disc osteophyte complex, superimposed more central 

protrusion/mostly contained extrusion estimated about 4 to 5 mm AP by 5 to

6 mm CC by about 7 mm transverse. There is indentation upon the ventral 

thecal sac greatest centrally, significant central canal stenosis 

estimated about 4 mm. There is fairly severe bilateral facet degenerative 

change. There is uncovertebral degenerative change bilaterally. There is 

moderate to severe right greater than left neural foramina compromise.

 

C6-C7: There is disc osteophyte complex slightly indenting the ventral 

thecal sac greater in the left lateral recess. There is probable also more

central protrusion although poorly characterized due to artifact, central 

canal probably narrowed about 8 mm. There is severe left uncovertebral 

degenerative change and larger osteophyte in the left neural foramen, also

bilateral facet hypertrophic change. There is severe narrowing of the left

neural foramen greater superiorly, minimal narrowing of the right neural 

foramen.

 

C7-T1: Spinal canal and neural foramina are adequate.

 

Impression:

1. There is significant spinal stenosis estimated about 4 mm at C5-6 by 

protrusion/mostly contained extrusion indenting the ventral thecal sac. 

There is lesser degree of mild spinal stenosis as described at other 

levels of the cervical spine other than sparing C7-T1.

2. There is multilevel facet and uncovertebral degenerative change 

resulting in multilevel neural foramina compromise as stated, more 

significant narrowing bilaterally at C5-6 and on the left at C6-7.

3. There is moderate degenerative disc disease C6-7, minimally of more 

superior levels. There is multilevel spondylosis.

4. There is atherosclerotic calcification of the carotid arteries in the 

neck bilaterally.

 

Electronically signed by: Greg Vazquez MD (8/2/2019 1:40 PM) [facility]

## 2019-08-14 ENCOUNTER — HOSPITAL ENCOUNTER (OUTPATIENT)
Dept: HOSPITAL 61 - KCIC | Age: 69
Discharge: HOME | End: 2019-08-14
Attending: NEUROLOGICAL SURGERY
Payer: COMMERCIAL

## 2019-08-14 DIAGNOSIS — M48.02: ICD-10-CM

## 2019-08-14 DIAGNOSIS — M25.78: ICD-10-CM

## 2019-08-14 DIAGNOSIS — E11.9: ICD-10-CM

## 2019-08-14 DIAGNOSIS — I65.23: ICD-10-CM

## 2019-08-14 DIAGNOSIS — M47.813: Primary | ICD-10-CM

## 2019-08-14 DIAGNOSIS — Z79.01: ICD-10-CM

## 2019-08-14 DIAGNOSIS — I10: ICD-10-CM

## 2019-08-14 DIAGNOSIS — M50.222: ICD-10-CM

## 2019-08-14 PROCEDURE — 72240 MYELOGRAPHY NECK SPINE: CPT

## 2019-08-14 PROCEDURE — 72126 CT NECK SPINE W/DYE: CPT

## 2019-08-14 NOTE — [facility]
Cervical myelogram 8/14/2019

 

Clinical History: Neck pain with right arm pain.

 

Technique: After the risks and benefits of the procedure were explained to

the patient, written informed consent was obtained. The patient was placed

prone on the fluoroscopy table and the lower back was prepped and draped 

in sterile fashion. 1% lidocaine was used as a local anesthetic. Under 

fluoroscopic guidance, the thecal sac of the lumbar cistern was punctured 

at the L2-L3 level using a 25-gauge Daniele needle using a coaxial 

technique. After confirming clear CSF return, 8 cc of Omnipaque 300 were 

injected through the needle under fluoroscopic guidance. Following this 

the needle was removed and hemostasis achieved at the puncture site. A 

sterile bandage was placed on the skin puncture site. The contrast column 

was advanced under fluoroscopy into the cervical spine. and AP and 

bilateral oblique digital spot radiographs and crosstable lateral and 

swimmer's lateral digital radiographs of the cervical spine with an 

obtained. Following this the patient was taken to CT where a CT scan of 

the cervical spine was performed. This will be reported separately. The 

patient was then observed for approximately 30 minutes prior to discharge 

home. The patient tolerated the procedure well and there were no immediate

complications. The total fluoroscopic time for this procedure was 27 

seconds. 3 digital spot radiographs were obtained.

 

FINDINGS: Very mild lateral curvature of the cervical spine is seen convex

to the right. Degenerative changes consisting of vertebral endplate 

sclerosis and mild to moderate anterior and posterior vertebral body 

osteophyte formation are seen throughout the cervical disc spaces. 

Degenerative changes are seen involving the uncovertebral and facet joints

throughout the cervical disc spaces. A mild anterior extradural defect is 

seen involving the contrast column at the C3-4 level. A moderate anterior 

extradural defect is seen upon the contrast column at the C5-6 level. 

There is no evidence of complete block contrast at any level involving the

cervical vertebrae. Atherosclerotic calcification is seen in the region of

the carotid bifurcations bilaterally.

 

IMPRESSION: Degenerative changes are seen throughout the cervical spine as

discussed above. A mild anterior extradural defect is seen upon the 

contrast column at C3-4. A moderate anterior extradural defect is seen 

upon the contrast column at C5-6.

 

Electronically signed by: Stanley Contreras MD (8/14/2019 1:20 PM) [facility]

## 2019-08-14 NOTE — [facility]
CT cervical myelogram 8/14/2019

 

Clinical History: Neck pain. Right arm pain. Cochlear implant.

 

Technique: This study was performed after the patient's cervical 

myelogram, contiguous, 0.6 mm axial sections were obtained through the 

cervical spine. 2 mm sagittal, axial and coronal reconstructed images were

obtained.

 

One or more of the following individualized dose reduction techniques were

utilized for this study:

 

1. Automated exposure control.

2. Adjustment of the mA and/or kV according to patient size.

3. Use of iterative reconstruction technique.

 

 

FINDINGS: Comparison is made to a CT scan of the cervical spine dated 

8/2/2019. Additional comparison is made to the patient's cervical 

myelogram performed earlier today.

 

Sagittal and coronal reconstructed images demonstrate mild lateral 

curvature of the cervical spine, convex to the right. There is 

straightening of the normal cervical lordosis. Degenerative changes 

consisting of vertebral endplate sclerosis and mild to moderate anterior 

and posterior vertebral body osteophyte formation are seen throughout the 

cervical disc spaces. Atherosclerotic calcification is seen in the region 

of the carotid bifurcations.

 

At the C2-3 disc space there is a mild generalized disc bulge. 

Superimposed on this disc bulge is a central/right paracentral focal disc 

protrusion. This measures 3 mm in AP diameter. Degenerative changes are 

seen involving the uncovertebral and facet joints bilaterally. These 

findings efface the anterior CSF, right greater than left resulting in 

mild right greater than left central spinal canal stenosis without 

evidence of cord impingement. No neural foraminal stenosis is seen.

 

At the C3-4 disc space there is a mild generalized disc bulge. 

Superimposed on this disc bulge is a focal central disc protrusion. This 

measures 2 mm in AP diameter. Degenerative changes are seen involving the 

uncovertebral and facet joints, left greater than right. These findings 

efface the anterior CSF without resulting in significant central spinal 

canal or neural foraminal stenosis.

 

At the C4-5 disc space there is a mild generalized disc bulge. 

Superimposed on this disc bulge is a focal central disc protrusion. This 

measures 2 mm in AP diameter. Degenerative changes are seen involving the 

uncovertebral and facet joints bilaterally. These findings do not result 

in significant central spinal canal or neural foraminal stenosis.

 

At the C5-6 disc space there is a mild generalized disc bulge. 

Superimposed on this disc bulge is a focal central disc herniation. This 

measures 6 mm in AP diameter. Degenerative changes are seen involving the 

uncovertebral and facet joints bilaterally. The disc herniation effaces 

the anterior CSF resulting in moderate central spinal canal stenosis with 

moderate cord impingement. Mild bilateral neural foraminal stenosis is 

seen.

 

At the C6-7 disc space there is a mild generalized disc bulge. This is 

eccentric to the left. Degenerative changes are seen involving the 

uncovertebral and facet joints, left greater than right. These findings 

when combined do not result in significant central spinal canal stenosis. 

Mild to moderate left greater than right neural foraminal stenosis is 

seen.

 

At the C7-T1 disc space there is a minimal generalized disc bulge. 

Degenerative changes are seen involving the facet joints bilaterally. 

These findings do not result in significant central spinal canal or neural

foraminal stenosis.

 

IMPRESSION: Degenerative changes are seen throughout the cervical spine. 

These findings result in mild right greater than left central spinal canal

stenosis without evidence of cord impingement at C2-3. Mild bilateral 

neural foraminal stenosis is seen at C5-6. Mild to moderate left greater 

than right neural foraminal stenosis is seen at C6-7. At the C5-6 disc 

space a focal central disc herniation is seen which results in moderate 

central spinal canal stenosis with moderate cord impingement.

 

Electronically signed by: Stanley Contreras MD (8/14/2019 1:40 PM) [facility]